# Patient Record
Sex: MALE | URBAN - METROPOLITAN AREA
[De-identification: names, ages, dates, MRNs, and addresses within clinical notes are randomized per-mention and may not be internally consistent; named-entity substitution may affect disease eponyms.]

---
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
## 2018-01-21 ENCOUNTER — HEALTH MAINTENANCE LETTER (OUTPATIENT)
Age: 18
End: 2018-01-21

## 2018-02-20 ENCOUNTER — CARE COORDINATION (OUTPATIENT)
Dept: CARDIOLOGY | Facility: CLINIC | Age: 18
End: 2018-02-20

## 2018-02-20 DIAGNOSIS — E78.5 HYPERLIPIDEMIA LDL GOAL <130: Primary | ICD-10-CM

## 2018-02-27 ENCOUNTER — OFFICE VISIT (OUTPATIENT)
Dept: CARDIOLOGY | Facility: CLINIC | Age: 18
End: 2018-02-27
Attending: INTERNAL MEDICINE
Payer: COMMERCIAL

## 2018-02-27 VITALS
OXYGEN SATURATION: 98 % | SYSTOLIC BLOOD PRESSURE: 133 MMHG | HEART RATE: 74 BPM | WEIGHT: 195.5 LBS | BODY MASS INDEX: 29.63 KG/M2 | DIASTOLIC BLOOD PRESSURE: 83 MMHG | HEIGHT: 68 IN

## 2018-02-27 DIAGNOSIS — R94.5 ABNORMAL RESULTS OF LIVER FUNCTION STUDIES: Primary | ICD-10-CM

## 2018-02-27 DIAGNOSIS — E78.5 HYPERLIPIDEMIA LDL GOAL <130: ICD-10-CM

## 2018-02-27 DIAGNOSIS — R94.5 ABNORMAL RESULTS OF LIVER FUNCTION STUDIES: ICD-10-CM

## 2018-02-27 LAB
ALBUMIN SERPL-MCNC: 4.5 G/DL (ref 3.4–5)
ALP SERPL-CCNC: 110 U/L (ref 65–260)
ALT SERPL W P-5'-P-CCNC: 114 U/L (ref 0–50)
AST SERPL W P-5'-P-CCNC: 51 U/L (ref 0–35)
BILIRUB DIRECT SERPL-MCNC: 0.2 MG/DL (ref 0–0.2)
BILIRUB SERPL-MCNC: 1 MG/DL (ref 0.2–1.3)
CHOLEST SERPL-MCNC: 151 MG/DL
HDLC SERPL-MCNC: 39 MG/DL
LDLC SERPL CALC-MCNC: 81 MG/DL
NONHDLC SERPL-MCNC: 112 MG/DL
PROT SERPL-MCNC: 8 G/DL (ref 6.8–8.8)
TRIGL SERPL-MCNC: 152 MG/DL

## 2018-02-27 PROCEDURE — 36415 COLL VENOUS BLD VENIPUNCTURE: CPT | Performed by: INTERNAL MEDICINE

## 2018-02-27 PROCEDURE — 99213 OFFICE O/P EST LOW 20 MIN: CPT | Mod: ZP | Performed by: INTERNAL MEDICINE

## 2018-02-27 PROCEDURE — 80061 LIPID PANEL: CPT | Performed by: INTERNAL MEDICINE

## 2018-02-27 PROCEDURE — G0463 HOSPITAL OUTPT CLINIC VISIT: HCPCS | Mod: ZF

## 2018-02-27 PROCEDURE — 80076 HEPATIC FUNCTION PANEL: CPT | Performed by: INTERNAL MEDICINE

## 2018-02-27 ASSESSMENT — PAIN SCALES - GENERAL: PAINLEVEL: NO PAIN (0)

## 2018-02-27 NOTE — MR AVS SNAPSHOT
After Visit Summary   2/27/2018    Phil Anderson    MRN: [id number]           Patient Information     Date Of Birth          2000        Visit Information        Provider Department      2/27/2018 3:30 PM RAFIA Bob MD [facility]        Today's Diagnoses     Abnormal results of liver function studies    -  1    Hyperlipidemia LDL goal <130          Care Instructions    Thank you for your visit today.  Please call me with any questions or concerns.   Caleb Pelaez RN  Cardiology Care Coordinator  [phone], press option 1 then option 3          Follow-ups after your visit        Your next 10 appointments already scheduled     Feb 27, 2018  3:30 PM CST   (Arrive by 3:15 PM)   Return Visit with RAFIA Bob MD   [facility] ([facility])    [address] [place] [address]  [address] [place] [address] [phone]              Who to contact     If you have questions or need follow up information about today's clinic visit or your schedule please contact [facility] directly at [phone].  Normal or non-critical lab and imaging results will be communicated to you by Compufirsthart, letter or phone within 4 business days after the clinic has received the results. If you do not hear from us within 7 days, please contact the clinic through Compufirsthart or phone. If you have a critical or abnormal lab result, we will notify you by phone as soon as possible.  Submit refill requests through [employer] or call your pharmacy and they will forward the refill request to us. Please allow 3 business days for your refill to be completed.          Additional Information About Your Visit        CompufirstharLendLayer Information     [employer] gives you secure access to your electronic health record. If you see a primary care provider, you can also send messages to your care team and make appointments. If you have questions, please call your primary care clinic.  If you do not have  "a primary care provider, please call [phone] and they will assist you.        Care EveryWhere ID     This is your Care EveryWhere ID. This could be used by other organizations to access your [place] medical records  [id number]        Your Vitals Were     Pulse Height Pulse Oximetry BMI (Body Mass Index)          74 1.727 m (5' 8\") 98% 29.73 kg/m2         Blood Pressure from Last 3 Encounters:   02/27/18 133/83   12/19/17 143/86    Weight from Last 3 Encounters:   02/27/18 88.7 kg (195 lb 8 oz) (91 %)*   12/19/17 85.2 kg (187 lb 12.8 oz) (87 %)*     * Growth percentiles are based on [facility] 2-20 Years data.              We Performed the Following     Follow-Up with Cardiologist        Primary Care Provider Fax #    Physician No Ref-Primary [phone]       No address on file        Equal Access to Services     THA FORMAN : Hadii thania sowo Kerwin, waaxda lupancho, qaybta kaalmada johanne, radha warren . So [facility] [phone].    ATENCIÓN: Si habla español, tiene a rodriguez disposición servicios gratuitos de asistencia lingüística. Llame al [phone].    We comply with applicable federal civil rights laws and [place] laws. We do not discriminate on the basis of race, color, national origin, age, disability, sex, sexual orientation, or gender identity.            Thank you!     Thank you for choosing [facility]  for your care. Our goal is always to provide you with excellent care. Hearing back from our patients is one way we can continue to improve our services. Please take a few minutes to complete the written survey that you may receive in the mail after your visit with us. Thank you!             Your Updated Medication List - Protect others around you: Learn how to safely use, store and throw away your medicines at www.disposemymeds.org.          This list is accurate as of 2/27/18  3:26 PM.  Always use your most recent med list.                   Brand Name " Dispense Instructions for use Diagnosis    ACETAMINOPHEN PO           atorvastatin 10 MG tablet    LIPITOR    90 tablet    Take 1 tablet (10 mg) by mouth daily    Hyperlipidemia LDL goal <130       UNABLE TO FIND      MEDICATION NAME: Per pt takes Rx for migraines but doesn't remember the name

## 2018-02-27 NOTE — PATIENT INSTRUCTIONS
Thank you for your visit today.  Please call me with any questions or concerns.   Caleb Pelaez RN  Cardiology Care Coordinator  [phone], press option 1 then option 3

## 2018-02-27 NOTE — LETTER
2/27/2018      RE: Phil Anderson  [address] [place] [address]       Dear Colleague,    Thank you for the opportunity to participate in the care of your patient, Phil Anderson, at the [facility] HEART [facility]. Please see a copy of my visit note below.       SUBJECTIVE:  Phil Anderson is a 17 year old male who presents for follow up.  Hyperlipidemia. Was started on Lipitor 10mg daily.  No complaints.    There are no active problems to display for this patient.   .  Current Outpatient Prescriptions   Medication Sig     UNABLE TO FIND MEDICATION NAME: Per pt takes Rx for migraines but doesn't remember the name     ACETAMINOPHEN PO      atorvastatin (LIPITOR) 10 MG tablet Take 1 tablet (10 mg) by mouth daily     No current facility-administered medications for this visit.      No past medical history on file.  No past surgical history on file.  No Known Allergies  Social History     Social History     Marital status: Single     Spouse name: N/A     Number of children: N/A     Years of education: N/A     Occupational History     Not on file.     Social History Main Topics     Smoking status: Never Smoker     Smokeless tobacco: Not on file     Alcohol use Not on file     Drug use: Not on file     Sexual activity: Not on file     Other Topics Concern     Not on file     Social History Narrative     No narrative on file     No family history on file.       REVIEW OF SYSTEMS:  General: negative, fever, chills, night sweats  Skin: negative, acne, rash and scaling  Eyes: negative, double vision, eye pain and photophobia  Ears/Nose/Throat: negative, nasal congestion and purulent rhinorrhea  Respiratory: No dyspnea on exertion, No cough, No hemoptysis and negative  Cardiovascular: negative, palpitations, tachycardia, irregular heart beat, chest pain, exertional chest pain or pressure, paroxysmal nocturnal dyspnea, dyspnea on exertion and orthopnea       OBJECTIVE:  Blood  "pressure 133/83, pulse 74, height 1.727 m (5' 8\"), weight 88.7 kg (195 lb 8 oz), SpO2 98 %.  General Appearance: healthy, alert, active and no distress  Head: Normocephalic. No masses, lesions, tenderness or abnormalities  Eyes: conjuctiva clear, PERRL, EOM intact  Ears: External ears normal. Canals clear. TM's normal.  Nose: Nares normal  Mouth: normal  Neck: Supple, no cervical adenopathy, no thyromegaly  Lungs: clear to auscultation  Cardiac: regular rate and rhythm, normal S1 and S2, no murmur         ASSESSMENT/PLAN:  Young patient with hyperlipidemia.  Started on Lipitor 10mg daily.  Recent LDL81.  LFT is up even before starting lipitor.  Will stop Lipitor and refer patient back to PCP for evaluation of elevated LFTs.  Result and plan discussed with patient.  Per orders.   Return to Clinic PRN.    Please do not hesitate to contact me if you have any questions/concerns.     Sincerely,     RAFIA Bob MD    "

## 2018-02-27 NOTE — NURSING NOTE
Chief Complaint   Patient presents with     Follow Up For     2 month return, labs prior     Vitals were taken and medications were reconciled.  Houston King, BRIAN  3:15 PM

## 2018-02-28 ENCOUNTER — TELEPHONE (OUTPATIENT)
Dept: CARDIOLOGY | Facility: CLINIC | Age: 18
End: 2018-02-28

## 2018-02-28 NOTE — PROGRESS NOTES
"   SUBJECTIVE:  Phil Anderson is a 17 year old male who presents for follow up.  Hyperlipidemia. Was started on Lipitor 10mg daily.  No complaints.    There are no active problems to display for this patient.   .  Current Outpatient Prescriptions   Medication Sig     UNABLE TO FIND MEDICATION NAME: Per pt takes Rx for migraines but doesn't remember the name     ACETAMINOPHEN PO      atorvastatin (LIPITOR) 10 MG tablet Take 1 tablet (10 mg) by mouth daily     No current facility-administered medications for this visit.      No past medical history on file.  No past surgical history on file.  No Known Allergies  Social History     Social History     Marital status: Single     Spouse name: N/A     Number of children: N/A     Years of education: N/A     Occupational History     Not on file.     Social History Main Topics     Smoking status: Never Smoker     Smokeless tobacco: Not on file     Alcohol use Not on file     Drug use: Not on file     Sexual activity: Not on file     Other Topics Concern     Not on file     Social History Narrative     No narrative on file     No family history on file.       REVIEW OF SYSTEMS:  General: negative, fever, chills, night sweats  Skin: negative, acne, rash and scaling  Eyes: negative, double vision, eye pain and photophobia  Ears/Nose/Throat: negative, nasal congestion and purulent rhinorrhea  Respiratory: No dyspnea on exertion, No cough, No hemoptysis and negative  Cardiovascular: negative, palpitations, tachycardia, irregular heart beat, chest pain, exertional chest pain or pressure, paroxysmal nocturnal dyspnea, dyspnea on exertion and orthopnea       OBJECTIVE:  Blood pressure 133/83, pulse 74, height 1.727 m (5' 8\"), weight 88.7 kg (195 lb 8 oz), SpO2 98 %.  General Appearance: healthy, alert, active and no distress  Head: Normocephalic. No masses, lesions, tenderness or abnormalities  Eyes: conjuctiva clear, PERRL, EOM intact  Ears: External ears normal. Canals clear. TM's " normal.  Nose: Nares normal  Mouth: normal  Neck: Supple, no cervical adenopathy, no thyromegaly  Lungs: clear to auscultation  Cardiac: regular rate and rhythm, normal S1 and S2, no murmur         ASSESSMENT/PLAN:  Young patient with hyperlipidemia.  Started on Lipitor 10mg daily.  Recent LDL81.  LFT is up even before starting lipitor.  Will stop Lipitor and refer patient back to PCP for evaluation of elevated LFTs.  Result and plan discussed with patient.  Per orders.   Return to Clinic PRN.

## 2018-02-28 NOTE — TELEPHONE ENCOUNTER
Called patient and discussed LFTs and Lipid results.  Advised to discontinue Lipitor.  Advised to have evaluation for elevated LFTs. Patient stated he will call his PCP and he can take care of it.

## 2020-03-11 ENCOUNTER — HEALTH MAINTENANCE LETTER (OUTPATIENT)
Age: 20
End: 2020-03-11

## 2020-12-27 ENCOUNTER — HEALTH MAINTENANCE LETTER (OUTPATIENT)
Age: 20
End: 2020-12-27

## 2021-04-24 ENCOUNTER — HEALTH MAINTENANCE LETTER (OUTPATIENT)
Age: 21
End: 2021-04-24

## 2021-10-09 ENCOUNTER — HEALTH MAINTENANCE LETTER (OUTPATIENT)
Age: 21
End: 2021-10-09

## 2022-05-21 ENCOUNTER — HEALTH MAINTENANCE LETTER (OUTPATIENT)
Age: 22
End: 2022-05-21

## 2022-09-17 ENCOUNTER — HEALTH MAINTENANCE LETTER (OUTPATIENT)
Age: 22
End: 2022-09-17

## 2023-06-04 ENCOUNTER — HEALTH MAINTENANCE LETTER (OUTPATIENT)
Age: 23
End: 2023-06-04